# Patient Record
Sex: MALE | Race: WHITE
[De-identification: names, ages, dates, MRNs, and addresses within clinical notes are randomized per-mention and may not be internally consistent; named-entity substitution may affect disease eponyms.]

---

## 2018-10-18 ENCOUNTER — HOSPITAL ENCOUNTER (EMERGENCY)
Dept: HOSPITAL 61 - ER | Age: 53
Discharge: HOME | End: 2018-10-18
Payer: COMMERCIAL

## 2018-10-18 VITALS
DIASTOLIC BLOOD PRESSURE: 88 MMHG | SYSTOLIC BLOOD PRESSURE: 131 MMHG | DIASTOLIC BLOOD PRESSURE: 88 MMHG | SYSTOLIC BLOOD PRESSURE: 131 MMHG

## 2018-10-18 VITALS — WEIGHT: 170 LBS | BODY MASS INDEX: 24.34 KG/M2 | HEIGHT: 70 IN

## 2018-10-18 DIAGNOSIS — G89.29: Primary | ICD-10-CM

## 2018-10-18 DIAGNOSIS — M54.41: ICD-10-CM

## 2018-10-18 DIAGNOSIS — Z87.442: ICD-10-CM

## 2018-10-18 DIAGNOSIS — M54.42: ICD-10-CM

## 2018-10-18 PROCEDURE — 96372 THER/PROPH/DIAG INJ SC/IM: CPT

## 2018-10-18 PROCEDURE — 99283 EMERGENCY DEPT VISIT LOW MDM: CPT

## 2018-10-18 NOTE — ED.ADGEN
Past Medical History


Past Medical History:  Kidney Stone


Additional Past Medical Histor:  2 RECENT SYNCOPAL EPISODES, CHRONIC BACK PAIN


Past Surgical History:  No Surgical History


Additional Past Surgical Histo:  KIDNEY STONE


Alcohol Use:  None


Drug Use:  None





Adult General


Chief Complaint


Chief Complaint:  BACK PAIN OR INJURY





HPI


HPI





Patient is a 53  year old male who presents with exacerbation of chronic low 

back pain. Patient rates his pain is 10 out of 10. Describes the pain as sharp 

and constant radiating to bilateral lower extremities. Patient states he has 

history of herniated and bulging disks. He states has been following up with 

his own primary care doctor who used to give him oxycodone 15 mg and  OxyContin 

20 mg. He states his doctor decided to start cutting back on his pain medicine 

and send him to a pain clinic he states his pain is currently not well managed 

with less dosage of the medicine. He states he has not had any contact with the 

pain clinic but he is still waiting for them to call him back. Patient denies 

any known injury. He spent an incredible amount of time asking how he can get 

his pain medicines, patient denies any known injury. Denies any cauda equina 

syndrome symptoms.





Review of Systems


Review of Systems


Constitutional:  Denies fever or chills. []


Musculoskeletal: Reports chronic low back pain


Integument:  Denies rash. [] 


Neurologic:  Denies headache, focal weakness or sensory changes. [] 


Endocrine:  Denies polyuria or polydipsia. [] 


Lymphatic:  Denies swollen glands. []





Current Medications


Current Medications





Current Medications








 Medications


  (Trade)  Dose


 Ordered  Sig/[facility]  Start Time


 Stop Time Status Last Admin


Dose Admin


 


 Diazepam


  (Valium)  5 mg  1X  ONCE  10/18/18 13:00


 10/18/18 13:01 DC  





 


 Morphine Sulfate


  (Morphine


 Sulfate)  10 mg  1X  ONCE  10/18/18 13:00


 10/18/18 13:01 DC  














Allergies


Allergies





Allergies








Coded Allergies Type Severity Reaction Last Updated Verified


 


  No Known Drug Allergies    6/25/15 No











Physical Exam


Physical Exam





Constitutional: Well developed, well nourished, no acute distress, non-toxic 

appearance. []


Abdomen: Bowel sounds normal, soft, no tenderness, no masses, no pulsatile 

masses. [] 


Skin: Warm, dry, no erythema, no rash. [] 


Back: No tenderness, no CVA tenderness. [] 


Extremities: No tenderness, no cyanosis, no clubbing, ROM intact, no edema. [] 


Neurologic: Alert and oriented X 3, normal motor function, normal sensory 

function, no focal deficits noted. []


Psychologic: Affect normal, judgement normal, mood normal. []





EKG


EKG


[]





Radiology/Procedures


Radiology/Procedures


[]





Course & Med Decision Making


Course & Med Decision Making


Pertinent Labs and Imaging studies reviewed. (See chart for details)





This is a 53-year-old male patient presenting to the ED today with chronic low 

back pain. See history of present illness. It sounds like patient's PCP is 

cutting back on patient's oxycodone and OxyContin. Patient was informed we will 

not give him any prescription narcotic medicines. Recommended he follows up 

with the pain clinic doctor as well as his PCP as soon as he can.





Dragon Disclaimer


Dragon Disclaimer


This electronic medical record was generated, in whole or in part, using a 

voice recognition dictation system.





Departure


Departure


Impression:  


 Primary Impression:  


 Low back pain


Disposition:  01 HOME, SELF-CARE


Condition:  STABLE


Referrals:  


CATARINO SCHMID MD


follow up in one week


Patient Instructions:  Back Pain, Adult, Easy-to-Read





Additional Instructions:  


You were evaluated in the emergency room for chronic low back pain. Please 

contact the pain clinic doctor and follow-up as well as your primary care 

doctor.





Problem Qualifiers








 Primary Impression:  


 Low back pain


 Chronicity:  chronic  Back pain laterality:  bilateral  Sciatica presence:  

with sciatica  Sciatica laterality:  bilateral sciatica  Qualified Codes:  

M54.42 - Lumbago with sciatica, left side; M54.41 - Lumbago with sciatica, 

right side; G89.29 - Other chronic pain








ELIEZER SOOD Oct 18, 2018 13:17

## 2018-11-27 ENCOUNTER — HOSPITAL ENCOUNTER (OUTPATIENT)
Dept: HOSPITAL 61 - PNCL | Age: 53
Discharge: HOME | End: 2018-11-27
Attending: ANESTHESIOLOGY
Payer: COMMERCIAL

## 2018-11-27 DIAGNOSIS — Z79.899: ICD-10-CM

## 2018-11-27 DIAGNOSIS — Z87.891: ICD-10-CM

## 2018-11-27 DIAGNOSIS — M19.90: ICD-10-CM

## 2018-11-27 DIAGNOSIS — K21.9: ICD-10-CM

## 2018-11-27 DIAGNOSIS — C17.9: ICD-10-CM

## 2018-11-27 DIAGNOSIS — E11.9: ICD-10-CM

## 2018-11-27 DIAGNOSIS — M79.661: ICD-10-CM

## 2018-11-27 DIAGNOSIS — M51.27: Primary | ICD-10-CM

## 2018-11-27 DIAGNOSIS — G30.8: ICD-10-CM

## 2018-11-27 PROCEDURE — 99214 OFFICE O/P EST MOD 30 MIN: CPT

## 2018-11-28 NOTE — PAIN
DATE OF SERVICE:  11/27/2018



INITIAL CONSULTATION FOR PAIN CLINIC



CHIEF COMPLAINT:  Low back and right lower extremity pain.



HISTORY OF PRESENT ILLNESS:  The patient is a 53-year-old male who presents with

history of pain for many years in the low back, right lower extremity in

radicular fashion.  The patient has recently seen his neurosurgeon, Dr. Waylon Grey recommending surgery, but lumbar fusion, which he was told will be an

extensive procedure.  The patient is not interested in having a surgery and

reports the pain has been this way for many years and not gotten significantly

worse, more painful over the last year, but no significant loss of motor

function, some weakness in his right leg with activity and ambulating.  The

patient reports the pain is constant, sharp, stabbing, changes during the day,

worse with activity, radiating into the right leg, mostly in the lateral

anterior aspect of the thigh, groin, posterior gluteus, posterior thigh,

posterior calf, anterior calf, anterior lower leg to the ankle.  The patient

reports it is shooting pain, worse with walking, standing, changing positions,

better with sitting or lying down, awakens him from sleep at least 2-3 times at

night.  Reports it can affect his bladder where he has difficulty emptying the

bladder at times when the pain is at its worst, but no incontinence.  The

patient reports it affects his ability to walk and his back is painful as well. 

The patient reports his disability rate from 0-10, 10 being the worst, is a 7-8

with family and home responsibilities, 8 with recreation, 6 with social

activity, 10 with occupation and sexual behavior, 7 with self-care and 3 with

life support activities.  The patient had MRI scan of the lumbar spine showing

mild degenerative retrolisthesis, L1-L2 and L5-S1; L5-S1 disk protrusion

contacting the right S1 nerve root, neural foramen narrowing throughout the

lumbar spine at L3-L4, moderate to severe left and mild to moderate severely

right neural foraminal narrowing.  L4-L5 shows 3 mm disk bulge with moderate

right and mild left neural foraminal narrowing, may affect the right L4 nerve

root without spinal stenosis.



PAST MEDICAL HISTORY:  Significant for gastroesophageal reflux, arthritis.



PAST SURGICAL HISTORY:  No previous surgeries.



CURRENT MEDICATIONS:  Include Paxil, atorvastatin, bupropion, Celebrex,

clonazepam, Flexeril, ropinirole, gabapentin, trazodone, oxycodone and

OxyContin.



ALLERGIES:  The patient has no known drug allergies.



FAMILY HISTORY:  Significant for Alzheimer's, brain tumors, heart disease,

diabetes, spinal problems.



SOCIAL HISTORY:  The patient does not drink alcohol.  He is using a patch to try

to quit smoking.  Does not use any illegal, illicit or recreational drugs.  He

is  and lives with his spouse locally in [place], [place] and works in

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



REVIEW OF SYSTEMS:  The patient's review of systems is positive for those items

mentioned in history of present illness.  All systems reviewed and otherwise

negative.  It is complete, full and well documented on the patient's chart.



PHYSICAL EXAMINATION:

VITAL SIGNS:  The patient's blood pressure is 100/55, pulse 67, respirations 18,

temperature 97.9 degrees Fahrenheit.  Height is 5 feet 9 inches, weight 172

pounds.

GENERAL:  The patient is awake, alert, oriented, appropriate, very pleasant

demeanor.

HEENT:  Head shows normocephalic, atraumatic.  Extraocular movements are intact

and symmetrical.  Oral cavity:  Mucous membranes moist and pink.  Dentition is

intact.

NECK:  Shows anterior throat supple without palpable lymphadenopathy noted. 

Swallow reflex symmetrical.

CHEST:  Shows normal with inspection.  Breath sounds clear to auscultation

bilaterally.

HEART:  Shows S1, S2 clear.  No murmurs auscultated.

ABDOMEN:  Soft, nontender, nondistended.  No palpable organomegaly is noted.  No

rebound or guarding demonstrated.

BACK:  Shows spine grossly in the midline.  Normal appearing thoracic kyphosis,

lumbar lordotic curvature and with inspection, the lumbar paraspinous shows

symmetrical without evidence of atrophy or hypertrophy, with palpation shows

some moderate tenderness throughout the upper, middle and lower distribution of

paraspinous musculature without radiation.  The patient shows good rotational

motion of the lumbar spine both laterally as well as extension and flexion

without significant difficulty or pain reported.  No tenderness over the sacrum

or sacroiliac region or spinous processes.

EXTREMITIES:  The patient's lower extremities show deep tendon reflexes at 2+ in

the patellar, 1+ in calcaneus tendons are equal.  Motor exam is strong with 5/5

dorsiflexion, extension on the left and 4/5 dorsiflexion, extension on the

right, quadriceps and hamstring flexion likewise 5/5 left, 4/5 on the right. 

Peripheral pulses are 1+ posterior tibia.  No peripheral edema is noted.  The

patient does have mild straight leg raise positive on the right at about 45

degrees, decreased with knee flexion, left side is negative.  Gaenslen's and

Janak's maneuvers are negative bilaterally as well.  The patient's back shows

full rotational motion of lumbar spine both laterally as well as extension and

flexion without significant difficulty.  The patient is able to stand, stand on

his toes without significant loss of balance, walks with a slight favoring gait,

appears to favor the right lower extremity, but only very mildly and not using

any assistive devices to ambulate such as canes or walkers.

SKIN:  Warm and dry, good turgor.  No edema.  No sores, rashes or bruising.



IMPRESSION:  This is a 53-year-old male with:

1.  Long history of low back, right lower extremity pain in a radicular fashion.

2.  MRI scan of lumbar spine as noted.

3.  Arthritis.

4.  Recent neurosurgical evaluation and referral for spinal cord stimulator

consideration.



PLAN:  Options were discussed with the patient.  The patient would like to

pursue spinal cord stimulator trial.  We will make the arrangements for this. 

Also, get psychiatric evaluation for compatibility with potentially an

implantable device.  The patient again is adamant against not pursuing surgery

without any focal deficits.  We discussed this in detail and we will take his

neurosurgeon's advice to pursue a stimulator at this time.  We will make the

arrangements to preauthorize.  The patient understands and agrees and will

follow up once preauthorization is obtained.  The patient was given information

regarding the stimulators for some research to do on his own.  He was asked to

call with any questions he or his spouse may have.

 



______________________________

CATARINO SCHMID MD



DR:  VANESSA/khurram  JOB#:  [id number] / [id number]

DD:  11/27/2018 12:28  DT:  11/28/2018 01:21



BIJAN Ness MD

## 2018-12-30 ENCOUNTER — HOSPITAL ENCOUNTER (EMERGENCY)
Dept: HOSPITAL 61 - ER | Age: 53
Discharge: HOME | End: 2018-12-30
Payer: COMMERCIAL

## 2018-12-30 VITALS — BODY MASS INDEX: 24.44 KG/M2 | HEIGHT: 69 IN | WEIGHT: 165 LBS

## 2018-12-30 VITALS — SYSTOLIC BLOOD PRESSURE: 130 MMHG | DIASTOLIC BLOOD PRESSURE: 81 MMHG

## 2018-12-30 DIAGNOSIS — Z87.442: ICD-10-CM

## 2018-12-30 DIAGNOSIS — M54.5: Primary | ICD-10-CM

## 2018-12-30 PROCEDURE — 99283 EMERGENCY DEPT VISIT LOW MDM: CPT

## 2018-12-30 PROCEDURE — 96372 THER/PROPH/DIAG INJ SC/IM: CPT

## 2018-12-30 NOTE — PHYS DOC
Past Medical History


Past Medical History:  Kidney Stone


Additional Past Medical Histor:  2 RECENT SYNCOPAL EPISODES, CHRONIC BACK PAIN


Past Surgical History:  No Surgical History


Additional Past Surgical Histo:  KIDNEY STONE


Alcohol Use:  None


Drug Use:  None





Adult General


Chief Complaint


Chief Complaint:  BACK PAIN OR INJURY





[facility]


HPI





Patient is a 53  year old who presents to the emergency room with complaints of 

low back pain. Patient reports history of chronic back pain states he has a 

history of 7 bulging disks in his lower spine. Patient denies any new injury. 

He states that he is out of his pain medication that he takes for relief of his 

symptoms because he had worked over time this last month and had to take more 

pain pills and were prescribed on a few days. Patient states he is eligible to 

have his pain medication oxycodone refilled on Wednesday of this week. He 

denies any numbness, tingling, or weakness of lower extremities. He also denies 

any saddle and station or loss of bowel or bladder control. Patient currently 

reports his pain is a 10 out of 10. He requests medication for relief of pain 

in the ER.





Review of Systems


Review of Systems





Constitutional: Denies fever or chills []


Respiratory: Denies cough or shortness of breath []


Cardiovascular: No additional information not addressed in HPI []


GI: Denies abdominal pain, or saddle anesthesia


: Denies dysuria or hematuria; see history of present illness[]


Musculoskeletal: See history of present illness


Integument: Denies rash or skin lesions []


Neurologic: Denies headache, focal weakness or sensory changes []





All other systems were reviewed and found to be within normal limits, except as 

documented in this note.





Current Medications


Current Medications





Current Medications








 Medications


  (Trade)  Dose


 Ordered  Sig/[facility]  Start Time


 Stop Time Status Last Admin


Dose Admin


 


 Morphine Sulfate


  (Morphine


 Sulfate)  10 mg  1X  ONCE  12/30/18 17:00


 12/30/18 17:01   





 


 Orphenadrine


 Citrate


  (Norflex)  60 mg  1X  ONCE  12/30/18 17:00


 12/30/18 17:01   














Allergies


Allergies





Allergies








Coded Allergies Type Severity Reaction Last Updated Verified


 


  No Known Drug Allergies    6/25/15 No











Physical Exam


Physical Exam





Constitutional: Well developed, well nourished, no acute distress, non-toxic 

appearance. []


HENT: Normocephalic, atraumatic, bilateral external ears normal, nose normal. []


Eyes: conjunctiva normal, no discharge. [] 


Neck: Normal range of motion, no tenderness, supple, no stridor. [] 


Cardiovascular:Heart rate regular rhythm, no murmur []


Lungs & Thorax:  Bilateral breath sounds clear to auscultation []


Skin: Warm, dry, no erythema, no rash. [] 


Back: No bony tenderness, patient reports pain with range of motion left


Extremities: No cyanosis, no clubbing, no edema. [] 


Neurologic: Alert and oriented X 3, normal motor function, normal sensory 

function, no focal deficits noted. []


Psychologic: Affect normal, judgement normal, mood normal. []





Current Patient Data


Vital Signs





 Vital Signs








  Date Time  Temp Pulse Resp B/P (MAP) Pulse Ox O2 Delivery O2 Flow Rate FiO2


 


12/30/18 16:17 97.6 89 16 130/81 (97) 98 Room Air  





 97.6       











EKG


EKG


[]





Radiology/Procedures


Radiology/Procedures


[]





Course & Med Decision Making


Course & Med Decision Making


Pertinent Labs and Imaging studies reviewed. (See chart for details)


Patient was given 10 mg of IM morphine and 60 mg of IM orphenadrine in the 

emergency department. I advised this patient is a new pain medications will be 

prescribed. Patient verbalizes understanding of the treatment and was in 

agreement with treatment plan.


[]





Dragon Disclaimer


Dragon Disclaimer


This electronic medical record was generated, in whole or in part, using a 

voice recognition dictation system.





Departure


Departure


Impression:  


 Primary Impression:  


 Low back pain


Disposition:  01 HOME, SELF-CARE


Condition:  STABLE


Referrals:  


BIJAN LOCKETT MD (PCP)


Patient Instructions:  Back Pain, Adult, Easy-to-Read





Additional Instructions:  


Fill your pain medications as prescribed. Follow-up with your primary care 

doctor. Return to the ER if your symptoms worsen.





Problem Qualifiers








 Primary Impression:  


 Low back pain


 Chronicity:  chronic  Back pain laterality:  unspecified  Sciatica presence:  

without sciatica  Qualified Codes:  M54.5 - Low back pain; G89.29 - Other 

chronic pain








JASIEL MATAMOROS Dec 30, 2018 16:49

## 2019-06-23 VITALS — SYSTOLIC BLOOD PRESSURE: 106 MMHG | DIASTOLIC BLOOD PRESSURE: 72 MMHG

## 2021-04-01 ENCOUNTER — HOSPITAL ENCOUNTER (OUTPATIENT)
Dept: HOSPITAL 61 - KCIC | Age: 56
End: 2021-04-01
Attending: FAMILY MEDICINE
Payer: COMMERCIAL

## 2021-04-01 DIAGNOSIS — R07.89: Primary | ICD-10-CM

## 2021-04-01 DIAGNOSIS — M25.512: ICD-10-CM

## 2021-04-01 PROCEDURE — 71046 X-RAY EXAM CHEST 2 VIEWS: CPT

## 2021-04-01 NOTE — [facility]
EXAM: CHEST 2 VIEWS.



HISTORY: Left chest wall pain.



COMPARISON: None.



FINDINGS: Frontal and lateral views of the chest are obtained.



There are no confluent infiltrates. There are scattered small calcified granulomas bilaterally. There
 is no pneumothorax or pleural effusion. The heart is not enlarged. Spinal stimulator electrodes are 
noted.



IMPRESSION:

1. No confluent infiltrates.



Electronically signed by: LILIAN Nation MD (4/1/2021 6:39 PM) [facility]